# Patient Record
Sex: FEMALE | Race: WHITE | NOT HISPANIC OR LATINO | URBAN - METROPOLITAN AREA
[De-identification: names, ages, dates, MRNs, and addresses within clinical notes are randomized per-mention and may not be internally consistent; named-entity substitution may affect disease eponyms.]

---

## 2017-07-11 ENCOUNTER — HOSPITAL ENCOUNTER (OUTPATIENT)
Dept: MAMMOGRAPHY | Facility: HOSPITAL | Age: 62
Discharge: HOME | End: 2017-07-11
Attending: FAMILY MEDICINE

## 2017-07-11 DIAGNOSIS — Z12.31 VISIT FOR SCREENING MAMMOGRAM: ICD-10-CM

## 2017-10-10 ENCOUNTER — RECORDS - HEALTHEAST (OUTPATIENT)
Dept: ADMINISTRATIVE | Facility: OTHER | Age: 62
End: 2017-10-10

## 2017-10-11 ENCOUNTER — RECORDS - HEALTHEAST (OUTPATIENT)
Dept: ADMINISTRATIVE | Facility: OTHER | Age: 62
End: 2017-10-11

## 2017-10-30 ENCOUNTER — OFFICE VISIT - HEALTHEAST (OUTPATIENT)
Dept: FAMILY MEDICINE | Facility: CLINIC | Age: 62
End: 2017-10-30

## 2017-10-30 DIAGNOSIS — Z00.00 ROUTINE GENERAL MEDICAL EXAMINATION AT A HEALTH CARE FACILITY: ICD-10-CM

## 2017-10-30 LAB
CHOLEST SERPL-MCNC: 188 MG/DL
FASTING STATUS PATIENT QL REPORTED: YES
HDLC SERPL-MCNC: 63 MG/DL
LDLC SERPL CALC-MCNC: 111 MG/DL
TRIGL SERPL-MCNC: 68 MG/DL

## 2017-10-30 ASSESSMENT — MIFFLIN-ST. JEOR: SCORE: 1325.13

## 2017-11-16 ENCOUNTER — COMMUNICATION - HEALTHEAST (OUTPATIENT)
Dept: FAMILY MEDICINE | Facility: CLINIC | Age: 62
End: 2017-11-16

## 2017-11-16 DIAGNOSIS — E78.5 HYPERLIPEMIA: ICD-10-CM

## 2018-01-08 ENCOUNTER — OFFICE VISIT - HEALTHEAST (OUTPATIENT)
Dept: ONCOLOGY | Facility: CLINIC | Age: 63
End: 2018-01-08

## 2018-01-08 DIAGNOSIS — C18.7 MALIGNANT NEOPLASM OF SIGMOID COLON (H): ICD-10-CM

## 2018-01-08 LAB
ALBUMIN SERPL-MCNC: 4 G/DL (ref 3.5–5)
ALP SERPL-CCNC: 71 U/L (ref 45–120)
ALT SERPL W P-5'-P-CCNC: 17 U/L (ref 0–45)
ANION GAP SERPL CALCULATED.3IONS-SCNC: 8 MMOL/L (ref 5–18)
AST SERPL W P-5'-P-CCNC: 16 U/L (ref 0–40)
BASOPHILS # BLD AUTO: 0.1 THOU/UL (ref 0–0.2)
BASOPHILS NFR BLD AUTO: 1 % (ref 0–2)
BILIRUB SERPL-MCNC: 0.3 MG/DL (ref 0–1)
BUN SERPL-MCNC: 16 MG/DL (ref 8–22)
CALCIUM SERPL-MCNC: 10.2 MG/DL (ref 8.5–10.5)
CEA SERPL-MCNC: 1.2 NG/ML (ref 0–3)
CHLORIDE BLD-SCNC: 105 MMOL/L (ref 98–107)
CO2 SERPL-SCNC: 29 MMOL/L (ref 22–31)
CREAT SERPL-MCNC: 0.78 MG/DL (ref 0.6–1.1)
EOSINOPHIL # BLD AUTO: 0.3 THOU/UL (ref 0–0.4)
EOSINOPHIL NFR BLD AUTO: 4 % (ref 0–6)
ERYTHROCYTE [DISTWIDTH] IN BLOOD BY AUTOMATED COUNT: 12.5 % (ref 11–14.5)
GFR SERPL CREATININE-BSD FRML MDRD: >60 ML/MIN/1.73M2
GLUCOSE BLD-MCNC: 87 MG/DL (ref 70–125)
HCT VFR BLD AUTO: 42.2 % (ref 35–47)
HGB BLD-MCNC: 14.3 G/DL (ref 12–16)
LYMPHOCYTES # BLD AUTO: 2.8 THOU/UL (ref 0.8–4.4)
LYMPHOCYTES NFR BLD AUTO: 33 % (ref 20–40)
MCH RBC QN AUTO: 29.9 PG (ref 27–34)
MCHC RBC AUTO-ENTMCNC: 33.9 G/DL (ref 32–36)
MCV RBC AUTO: 88 FL (ref 80–100)
MONOCYTES # BLD AUTO: 0.8 THOU/UL (ref 0–0.9)
MONOCYTES NFR BLD AUTO: 10 % (ref 2–10)
NEUTROPHILS # BLD AUTO: 4.5 THOU/UL (ref 2–7.7)
NEUTROPHILS NFR BLD AUTO: 53 % (ref 50–70)
PLATELET # BLD AUTO: 268 THOU/UL (ref 140–440)
PMV BLD AUTO: 10.8 FL (ref 8.5–12.5)
POTASSIUM BLD-SCNC: 3.5 MMOL/L (ref 3.5–5)
PROT SERPL-MCNC: 8.1 G/DL (ref 6–8)
RBC # BLD AUTO: 4.78 MILL/UL (ref 3.8–5.4)
SODIUM SERPL-SCNC: 142 MMOL/L (ref 136–145)
WBC: 8.5 THOU/UL (ref 4–11)

## 2018-01-08 RX ORDER — MULTIVIT-MINERALS/FOLIC ACID 200 MCG
TABLET,CHEWABLE ORAL
Status: SHIPPED | COMMUNITY
Start: 2014-09-24

## 2018-01-08 ASSESSMENT — MIFFLIN-ST. JEOR: SCORE: 1343.73

## 2018-11-05 ENCOUNTER — OFFICE VISIT - HEALTHEAST (OUTPATIENT)
Dept: FAMILY MEDICINE | Facility: CLINIC | Age: 63
End: 2018-11-05

## 2018-11-05 DIAGNOSIS — Z13.220 ENCOUNTER FOR SCREENING FOR LIPOID DISORDERS: ICD-10-CM

## 2018-11-05 DIAGNOSIS — Z12.31 ENCOUNTER FOR SCREENING MAMMOGRAM FOR MALIGNANT NEOPLASM OF BREAST: ICD-10-CM

## 2018-11-05 DIAGNOSIS — Z78.0 POSTMENOPAUSAL: ICD-10-CM

## 2018-11-05 DIAGNOSIS — Z00.01 ENCOUNTER FOR GENERAL ADULT MEDICAL EXAMINATION WITH ABNORMAL FINDINGS: ICD-10-CM

## 2018-11-05 DIAGNOSIS — Z85.038 HISTORY OF MALIGNANT NEOPLASM OF LARGE INTESTINE: ICD-10-CM

## 2018-11-05 DIAGNOSIS — E78.5 DYSLIPIDEMIA: ICD-10-CM

## 2018-11-05 LAB
ALT SERPL W P-5'-P-CCNC: 23 U/L (ref 0–45)
CHOLEST SERPL-MCNC: 221 MG/DL
FASTING STATUS PATIENT QL REPORTED: YES
FASTING STATUS PATIENT QL REPORTED: YES
GLUCOSE BLD-MCNC: 90 MG/DL (ref 70–99)
HDLC SERPL-MCNC: 67 MG/DL
LDLC SERPL CALC-MCNC: 129 MG/DL
TRIGL SERPL-MCNC: 126 MG/DL

## 2018-11-05 ASSESSMENT — MIFFLIN-ST. JEOR: SCORE: 1339.87

## 2018-11-07 ENCOUNTER — COMMUNICATION - HEALTHEAST (OUTPATIENT)
Dept: FAMILY MEDICINE | Facility: CLINIC | Age: 63
End: 2018-11-07

## 2018-11-07 DIAGNOSIS — E78.5 HYPERLIPEMIA: ICD-10-CM

## 2018-11-12 ENCOUNTER — COMMUNICATION - HEALTHEAST (OUTPATIENT)
Dept: FAMILY MEDICINE | Facility: CLINIC | Age: 63
End: 2018-11-12

## 2018-11-12 DIAGNOSIS — E78.5 HYPERLIPEMIA: ICD-10-CM

## 2018-11-20 ENCOUNTER — RECORDS - HEALTHEAST (OUTPATIENT)
Dept: ADMINISTRATIVE | Facility: OTHER | Age: 63
End: 2018-11-20

## 2018-11-20 ENCOUNTER — RECORDS - HEALTHEAST (OUTPATIENT)
Dept: BONE DENSITY | Facility: CLINIC | Age: 63
End: 2018-11-20

## 2018-11-20 DIAGNOSIS — Z78.0 ASYMPTOMATIC MENOPAUSAL STATE: ICD-10-CM

## 2019-01-21 ENCOUNTER — HOSPITAL ENCOUNTER (OUTPATIENT)
Dept: MAMMOGRAPHY | Facility: CLINIC | Age: 64
Discharge: HOME | End: 2019-01-21
Attending: FAMILY MEDICINE

## 2019-01-21 DIAGNOSIS — Z12.31 ENCOUNTER FOR SCREENING MAMMOGRAM FOR MALIGNANT NEOPLASM OF BREAST: ICD-10-CM

## 2019-11-04 ENCOUNTER — COMMUNICATION - HEALTHEAST (OUTPATIENT)
Dept: FAMILY MEDICINE | Facility: CLINIC | Age: 64
End: 2019-11-04

## 2019-11-04 DIAGNOSIS — E78.5 HYPERLIPEMIA: ICD-10-CM

## 2019-12-12 ENCOUNTER — OFFICE VISIT - HEALTHEAST (OUTPATIENT)
Dept: FAMILY MEDICINE | Facility: CLINIC | Age: 64
End: 2019-12-12

## 2019-12-12 DIAGNOSIS — M85.89 OSTEOPENIA OF MULTIPLE SITES: ICD-10-CM

## 2019-12-12 DIAGNOSIS — Z00.01 ENCOUNTER FOR GENERAL ADULT MEDICAL EXAMINATION WITH ABNORMAL FINDINGS: ICD-10-CM

## 2019-12-12 DIAGNOSIS — Z23 NEED FOR PNEUMOCOCCAL VACCINATION: ICD-10-CM

## 2019-12-12 DIAGNOSIS — Z13.220 ENCOUNTER FOR SCREENING FOR LIPOID DISORDERS: ICD-10-CM

## 2019-12-12 DIAGNOSIS — Z13.1 ENCOUNTER FOR SCREENING FOR DIABETES MELLITUS: ICD-10-CM

## 2019-12-12 DIAGNOSIS — E78.5 HYPERLIPIDEMIA LDL GOAL <130: ICD-10-CM

## 2019-12-12 LAB
ALT SERPL W P-5'-P-CCNC: 18 U/L (ref 0–45)
CHOLEST SERPL-MCNC: 180 MG/DL
FASTING STATUS PATIENT QL REPORTED: YES
FASTING STATUS PATIENT QL REPORTED: YES
GLUCOSE BLD-MCNC: 92 MG/DL (ref 70–99)
HDLC SERPL-MCNC: 59 MG/DL
LDLC SERPL CALC-MCNC: 99 MG/DL
TRIGL SERPL-MCNC: 108 MG/DL

## 2019-12-12 ASSESSMENT — MIFFLIN-ST. JEOR: SCORE: 1339.36

## 2019-12-12 ASSESSMENT — PATIENT HEALTH QUESTIONNAIRE - PHQ9: SUM OF ALL RESPONSES TO PHQ QUESTIONS 1-9: 0

## 2019-12-12 ASSESSMENT — ANXIETY QUESTIONNAIRES
5. BEING SO RESTLESS THAT IT IS HARD TO SIT STILL: NOT AT ALL
3. WORRYING TOO MUCH ABOUT DIFFERENT THINGS: NOT AT ALL
IF YOU CHECKED OFF ANY PROBLEMS ON THIS QUESTIONNAIRE, HOW DIFFICULT HAVE THESE PROBLEMS MADE IT FOR YOU TO DO YOUR WORK, TAKE CARE OF THINGS AT HOME, OR GET ALONG WITH OTHER PEOPLE: NOT DIFFICULT AT ALL
2. NOT BEING ABLE TO STOP OR CONTROL WORRYING: NOT AT ALL
GAD7 TOTAL SCORE: 0
1. FEELING NERVOUS, ANXIOUS, OR ON EDGE: NOT AT ALL
6. BECOMING EASILY ANNOYED OR IRRITABLE: NOT AT ALL
7. FEELING AFRAID AS IF SOMETHING AWFUL MIGHT HAPPEN: NOT AT ALL
4. TROUBLE RELAXING: NOT AT ALL

## 2020-01-10 ENCOUNTER — COMMUNICATION - HEALTHEAST (OUTPATIENT)
Dept: FAMILY MEDICINE | Facility: CLINIC | Age: 65
End: 2020-01-10

## 2020-01-10 DIAGNOSIS — E78.5 HYPERLIPEMIA: ICD-10-CM

## 2020-06-09 ENCOUNTER — HOSPITAL ENCOUNTER (OUTPATIENT)
Dept: MAMMOGRAPHY | Facility: CLINIC | Age: 65
Discharge: HOME | End: 2020-06-09
Attending: FAMILY MEDICINE

## 2020-06-09 DIAGNOSIS — Z12.31 VISIT FOR SCREENING MAMMOGRAM: ICD-10-CM

## 2021-02-05 ENCOUNTER — COMMUNICATION - HEALTHEAST (OUTPATIENT)
Dept: FAMILY MEDICINE | Facility: CLINIC | Age: 66
End: 2021-02-05

## 2021-02-05 DIAGNOSIS — E78.5 HYPERLIPEMIA: ICD-10-CM

## 2021-02-05 RX ORDER — SIMVASTATIN 20 MG
20 TABLET ORAL AT BEDTIME
Qty: 90 TABLET | Refills: 3 | Status: SHIPPED | OUTPATIENT
Start: 2021-02-05

## 2021-05-25 ENCOUNTER — RECORDS - HEALTHEAST (OUTPATIENT)
Dept: ADMINISTRATIVE | Facility: CLINIC | Age: 66
End: 2021-05-25

## 2021-05-26 ENCOUNTER — RECORDS - HEALTHEAST (OUTPATIENT)
Dept: ADMINISTRATIVE | Facility: CLINIC | Age: 66
End: 2021-05-26

## 2021-05-26 ASSESSMENT — PATIENT HEALTH QUESTIONNAIRE - PHQ9: SUM OF ALL RESPONSES TO PHQ QUESTIONS 1-9: 0

## 2021-05-27 ENCOUNTER — RECORDS - HEALTHEAST (OUTPATIENT)
Dept: ADMINISTRATIVE | Facility: CLINIC | Age: 66
End: 2021-05-27

## 2021-05-28 ENCOUNTER — RECORDS - HEALTHEAST (OUTPATIENT)
Dept: ADMINISTRATIVE | Facility: CLINIC | Age: 66
End: 2021-05-28

## 2021-05-28 ASSESSMENT — ANXIETY QUESTIONNAIRES: GAD7 TOTAL SCORE: 0

## 2021-05-29 ENCOUNTER — RECORDS - HEALTHEAST (OUTPATIENT)
Dept: ADMINISTRATIVE | Facility: CLINIC | Age: 66
End: 2021-05-29

## 2021-05-30 ENCOUNTER — RECORDS - HEALTHEAST (OUTPATIENT)
Dept: ADMINISTRATIVE | Facility: CLINIC | Age: 66
End: 2021-05-30

## 2021-05-31 VITALS — HEIGHT: 65 IN | WEIGHT: 175.1 LBS | BODY MASS INDEX: 29.17 KG/M2

## 2021-05-31 VITALS — WEIGHT: 179.2 LBS | BODY MASS INDEX: 29.85 KG/M2 | HEIGHT: 65 IN

## 2021-06-02 VITALS — HEIGHT: 65 IN | WEIGHT: 177.8 LBS | BODY MASS INDEX: 29.62 KG/M2

## 2021-06-03 NOTE — TELEPHONE ENCOUNTER
Refill Approved    Rx renewed per Medication Renewal Policy. Medication was last renewed on 11/12/18    Upcoming appointment 12/12/19    Xiao Avendano, [facility] Triage/Med Refill 11/4/2019     Requested Prescriptions   Pending Prescriptions Disp Refills     simvastatin (ZOCOR) 20 MG tablet 90 tablet 3     Sig: Take 1 tablet (20 mg total) by mouth at bedtime.       Statins Refill Protocol (Hmg CoA Reductase Inhibitors) Passed - 11/4/2019  8:16 AM        Passed - PCP or prescribing provider visit in past 12 months      Last office visit with prescriber/PCP: Visit date not found OR same dept: Visit date not found OR same specialty: Visit date not found  Last physical: 11/5/2018 Last MTM visit: Visit date not found   Next visit within 3 mo: Visit date not found  Next physical within 3 mo: Visit date not found  Prescriber OR PCP: Hien Pelaez MD  Last diagnosis associated with med order: 1. Hyperlipemia  - simvastatin (ZOCOR) 20 MG tablet; Take 1 tablet (20 mg total) by mouth at bedtime.  Dispense: 90 tablet; Refill: 3    If protocol passes may refill for 12 months if within 3 months of last provider visit (or a total of 15 months).

## 2021-06-04 VITALS
WEIGHT: 177 LBS | HEIGHT: 65 IN | DIASTOLIC BLOOD PRESSURE: 72 MMHG | SYSTOLIC BLOOD PRESSURE: 146 MMHG | BODY MASS INDEX: 29.49 KG/M2 | HEART RATE: 80 BPM

## 2021-06-04 NOTE — PROGRESS NOTES
Assessment/Plan      Annual Wellness Visit  new patient to my practice  Mary was seen today for annual wellness visit.    Diagnoses and all orders for this visit:    Need for pneumococcal vaccination  Comments:  refuse vaccination today will call us back after double check with insurance     Encounter for general adult medical examination with abnormal findings    Osteopenia of multiple sites    Encounter for screening for diabetes mellitus  -     Glucose    Encounter for screening for lipoid disorders  -     Lipid Luce, FASTING; Future  -     Lipid Cascade, FASTING    Hyperlipidemia LDL goal <130  -     ALT (SGPT)    Other orders  -     Pneumococcal conjugate vaccine 13-valent 6wks-17yrs; >50yrs    will Follow up yearly, no major concerns refill statin prn   Will Follow up on results change on tx plan based on that   Subjective:      Mary Iglesias is a 64 y.o. female who presents for a Subsequent Annual Wellness Visit.   No concerns   Taking cholesterol medication with no side effect.  Levels are always very good.  Eat healthy and exercise daily  History of colon cancer last colonoscopy just last year now she moved 5-year cycle.  HM were updated      Healthy Habits:   Regular Exercise: Yes  Sunscreen Use: Yes  Healthy Diet: Yes  Dental Visits Regularly: Yes  Seat Belt: Yes  cologuard  No  Colonoscopy: Yes  Lipid Profile: Yes  Glucose Screen: Yes  Prevention of Osteoporosis: Yes  Last Dexa: Yes  Guns at Home:  N/A      Immunization History   Administered Date(s) Administered     Td,adult,historic,unspecified 07/08/2014       Immunization status: up to date and documented, Refuses Immunization.    Current Outpatient Medications   Medication Sig Dispense Refill     cholecalciferol, vitamin D3, 2,000 unit Tab Take by mouth.       lactobacillus comb no.10 (PROBIOTIC) 20 billion cell cap Take by mouth.       MAGNESIUM CITRATE ORAL Take by mouth.       multivitamin capsule Take 1 capsule by mouth daily.        simvastatin (ZOCOR) 20 MG tablet Take 1 tablet (20 mg total) by mouth at bedtime. 30 tablet 0     No current facility-administered medications for this visit.      Past Medical History:   Diagnosis Date     Colon cancer (H)      Past Surgical History:   Procedure Laterality Date     BREAST BIOPSY Right 2007     OH APPENDECTOMY      Description: Appendectomy;  Recorded: 2013;     OH DILATION/CURETTAGE,DIAGNOSTIC      Description: Dilation And Curettage;  Recorded: 2012;     OH PART REMOVAL COLON W ANASTOMOSIS      Description: Partial Colectomy - Sigmoid;  Recorded: 2013;       Patient has no known allergies.    Family History   Problem Relation Age of Onset     Colon cancer Mother      Stroke Mother      Heart attack Father        Social History     Socioeconomic History     Marital status:      Spouse name: Not on file     Number of children: Not on file     Years of education: Not on file     Highest education level: Not on file   Occupational History     Not on file   Social Needs     Financial resource strain: Not on file     Food insecurity:     Worry: Not on file     Inability: Not on file     Transportation needs:     Medical: Not on file     Non-medical: Not on file   Tobacco Use     Smoking status: Former Smoker     Last attempt to quit: 2006     Years since quittin.0     Smokeless tobacco: Never Used   Substance and Sexual Activity     Alcohol use: Not on file     Drug use: Not on file     Sexual activity: Not on file   Lifestyle     Physical activity:     Days per week: Not on file     Minutes per session: Not on file     Stress: Not on file   Relationships     Social connections:     Talks on phone: Not on file     Gets together: Not on file     Attends [employer] service: Not on file     Active member of club or organization: Not on file     Attends meetings of clubs or organizations: Not on file     Relationship status: Not on file     Intimate partner violence:     " Fear of current or ex partner: Not on file     Emotionally abused: Not on file     Physically abused: Not on file     Forced sexual activity: Not on file   Other Topics Concern     Not on file   Social History Narrative     lives with her     2 kids and 6 grandkids    Most of the family in the [facility]    Hien Pelaez MD 11/5/2018 10:12 AM         Social History     Social History Narrative     lives with her     2 kids and 6 grandkids    Most of the family in the [facility]    Hien Pelaez MD 11/5/2018 10:12 AM         Current Diet:  well balanced diet    Functional Ability/Level of Safety  Fall Risk Factors:  No risk  Home Safety Risk Factors: None    Advanced Directive:  I have had an Advanced Directive discussion with the patient.    Co-Managers and Medical Equipment/Suppliers: None    Review of Systems  A 12 point comprehensive review of systems was negative except as noted.    Vitals:    12/12/19 0925   BP: 146/72   Patient Site: Left Arm   Patient Position: Sitting   Cuff Size: Adult Large   Pulse: 80   Weight: 177 lb (80.3 kg)   Height: 5' 5.35\" (1.66 m)         General: Alert, no acute distress.   HEENT: normocephalic conjunctivae are clear, Normal pearly TMs bilaterally without erythema, pus or fluid.   Nose is clear.  Oropharynx is moist and clear,   Neck: supple without adenopathy or thyromegaly.  Lungs: Good aeration bilaterally. Clear to auscultation without wheezes, rales or rhonci.  Heart: regular rate and rhythm, normal S1 and S2, no murmurs  Abdomen: soft and nontender, bowel sounds are present, no hepatosplenomegaly or mass palpable.  Skin: clear without rash or lesions  Neuro: alert, interactive moving all extremities equally, normal muscle tone in all 4 extremities, deep tendon reflexes 2+ symmetrically at the patella    The following high BMI interventions were performed this visit: dietary management education, guidance, and counseling    EKG : not done "     Assessment Results 12/12/2019   Activities of Daily Living No help needed   Instrumental Activities of Daily Living No help needed   Mini Cog Total Score 5   Some recent data might be hidden     A Mini Cog score of 0-2 suggests the possibility of dementia, score of 3-5 suggests no dementia    Identified Health Risks:           Hien Pelaez MD 12/12/2019 10:10 AM              The patient was counseled and encouraged to consider modifying their diet and eating habits. She was provided with information on recommended healthy diet options.  Information regarding advance directives (living andrade), including where she can download the appropriate form, was provided to the patient via the AVS.

## 2021-06-13 NOTE — PROGRESS NOTES
"Assessment/Plan:        Diagnoses and all orders for this visit:    Routine general medical examination at a health care facility  -     Lipid Cascade  -     HM2(CBC w/o Differential)  -     Glucose        She is fasting.  We will do labs.  Continue with healthy food choices, regular exercise.  Mammogram in July 2018.  DEXA scan September 2018.  Encourage annual physical.  Declined flu shot.  She was agreeable with the plans.  Subjective:    Patient ID: Mary Iglesias is a 62 y.o. female.    HPI    Mary is here for her physical.  She has a history of colon cancer diagnosed in 2013.  She had a colonoscopy this month and shows sessile serrated adenoma, cleared for 5 years.  She denies any blood in her stool, changes in her stool size, abdominal pains.  No recent unexplained weight loss.  Has been trying to eat healthy, regular exercise.    Last mammogram July 11, 2017, normal.  DEXA scan September 2016, osteopenia with low fracture risk.  Pap smear in 2015, normal.  No previous abnormal Pap smears.  No vaginal discharge, bleeding at this time.    Review of Systems  As above otherwise negative.    Past medical history, surgery and family history reviewed and as above.    Social history: Denies any issues with smoking.  Has around 3 alcoholic beverage per week.  She walks 2 miles, 5 days a week.  She also goes to [employer], 3-4 days a week and exercises for around 30 minutes.        Objective:    Physical Exam  /72 (Patient Site: Right Arm, Patient Position: Sitting, Cuff Size: Adult Regular)  Pulse 78  Ht 5' 5\" (1.651 m)  Wt 175 lb 1.6 oz (79.4 kg)  Breastfeeding? No  BMI 29.14 kg/m2    Vital signs noted above. AAO ×3.  HEENT negative.  Neck: Supple neck, nonpalpable cervical lymph nodes. No thyromegaly. Lungs: Clear to auscultation bilateral.  Heart: S1-S2 regular rate and rhythm, no murmurs were noted.  Abdomen: Flabby, soft with bowel sounds and nontender.  Extremities: No edema, pulses were full and equal. " Breast exam: No nipple bleeding or discharge, no mass or tenderness, no axillary lymphadenopathy.  Pelvic exam: Negative CMT, no adnexal mass or tenderness.

## 2021-06-15 NOTE — PROGRESS NOTES
[facility] Hematology and Oncology Progress Note    Patient: Mary Iglesias  MRN: [id number]  Date of Service: 01/08/2018      Assessment and Plan:    1.  Colon cancer: No clinical evidence of disease recurrence.  CEA is normal.  She is now over 4 years out.  She has stage I cancer.  At this point I do not think we need to follow her anymore in the oncology clinic.  She should continue with her regular screening colonoscopies.  She should let us know in the future if she develops any abdominal pain, nausea vomiting, or other new symptoms.  Questions were answered.    ECOG Performance   ECOG Performance Status: 0    Distress Assessment  Distress Assessment Score: No distress    Pain  Currently in Pain: No/denies    Diagnosis:    1. 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 Diagnosed August 2013      2. Left upper lobe pulmonary nodule      Treatment:    She had a left hemicolectomy August 15, 2013 by Dr. Garcia. She did not receive any adjuvant therapy.    Interim History:    Mary returns for follow-up visit.  She is last seen about a year ago.  She has been doing well.  No abdominal pain, no nausea vomiting or change in bowel or bladder habits.    Review of Systems:    Constitutional  Constitutional (WDL): All constitutional elements are within defined limits  Neurosensory  Neurosensory (WDL): All neurosensory elements are within defined limits  Cardiovascular  Cardiovascular (WDL): All cardiovascular elements are within defined limits  Pulmonary  Respiratory (WDL): Within Defined Limits  Gastrointestinal  Gastrointestinal (WDL): All gastrointestinal elements are within defined limits  Genitourinary  Genitourinary (WDL): All genitourinary elements are within defined limits  Integumentary  Integumentary (WDL): All integumentary elements are within defined limits  Patient Coping  Patient Coping: Accepting  Accompanied by  Accompanied by: Alone    Past History:    Past Medical History:   Diagnosis Date      "Colon cancer 2013     Physical Exam:    Recent Vitals 1/8/2018   Height 5' 5\"   Weight 179 lbs 3 oz   BSA (m2) 1.93 m2   /77   Pulse 90   Temp -   Temp src -   SpO2 95   Some recent data might be hidden     General: patient appears stated age of 62 y.o.. Nontoxic and in no distress.   HEENT: Head: atraumatic, normocephalic. Sclerae anicteric.  Chest:  Normal respiratory effort  Cardiac:  No edema.   Abdomen: abdomen is non-distended  Extremities: normal tone and muscle bulk.  Skin: no lesions or rash. Warm and dry.   CNS: alert and oriented x3. Grossly non-focal.   Psychiatric: normal mood and affect.     Lab Results:    Results for DEACON VALERIO (MRN [id number]) as of 1/28/2018 20:54   Ref. Range 1/8/2018 15:27   Sodium Latest Ref Range: 136 - 145 mmol/L 142   Potassium Latest Ref Range: 3.5 - 5.0 mmol/L 3.5   Chloride Latest Ref Range: 98 - 107 mmol/L 105   CO2 Latest Ref Range: 22 - 31 mmol/L 29   Anion Gap, Calculation Latest Ref Range: 5 - 18 mmol/L 8   BUN Latest Ref Range: 8 - 22 mg/dL 16   Creatinine Latest Ref Range: 0.60 - 1.10 mg/dL 0.78   GFR MDRD Af Amer Latest Ref Range: >60 mL/min/1.73m2 >60   GFR MDRD Non Af Amer Latest Ref Range: >60 mL/min/1.73m2 >60   Calcium Latest Ref Range: 8.5 - 10.5 mg/dL 10.2   AST Latest Ref Range: 0 - 40 U/L 16   ALT Latest Ref Range: 0 - 45 U/L 17   ALBUMIN Latest Ref Range: 3.5 - 5.0 g/dL 4.0   Protein, Total Latest Ref Range: 6.0 - 8.0 g/dL 8.1 (H)   Alkaline Phosphatase Latest Ref Range: 45 - 120 U/L 71   Bilirubin, Total Latest Ref Range: 0.0 - 1.0 mg/dL 0.3   Glucose Latest Ref Range: 70 - 125 mg/dL 87   CEA Latest Ref Range: 0.0 - 3.0 ng/mL 1.2   WBC Latest Ref Range: 4.0 - 11.0 thou/uL 8.5   RBC Latest Ref Range: 3.80 - 5.40 mill/uL 4.78   Hemoglobin Latest Ref Range: 12.0 - 16.0 g/dL 14.3   Hematocrit Latest Ref Range: 35.0 - 47.0 % 42.2   MCV Latest Ref Range: 80 - 100 fL 88   MCH Latest Ref Range: 27.0 - 34.0 pg 29.9   MCHC Latest Ref Range: 32.0 - 36.0 " g/dL 33.9   RDW Latest Ref Range: 11.0 - 14.5 % 12.5   Platelets Latest Ref Range: 140 - 440 thou/uL 268   MPV Latest Ref Range: 8.5 - 12.5 fL 10.8   Neutrophils % Latest Ref Range: 50 - 70 % 53   Lymphocytes % Latest Ref Range: 20 - 40 % 33   Monocytes % Latest Ref Range: 2 - 10 % 10   Eosinophils % Latest Ref Range: 0 - 6 % 4   Basophils % Latest Ref Range: 0 - 2 % 1   Neutrophils Absolute Latest Ref Range: 2.0 - 7.7 thou/uL 4.5   Lymphocytes Absolute Latest Ref Range: 0.8 - 4.4 thou/uL 2.8   Monocytes Absolute Latest Ref Range: 0.0 - 0.9 thou/uL 0.8   Eosinophils Absolute Latest Ref Range: 0.0 - 0.4 thou/uL 0.3   Basophils Absolute Latest Ref Range: 0.0 - 0.2 thou/uL 0.1     Imaging:    No results found.      Signed by: Anuel Lockett MD

## 2021-06-16 PROBLEM — M85.89 OSTEOPENIA OF MULTIPLE SITES: Status: ACTIVE | Noted: 2019-12-12

## 2021-06-16 PROBLEM — Z85.038 HISTORY OF MALIGNANT NEOPLASM OF LARGE INTESTINE: Status: ACTIVE | Noted: 2019-12-12

## 2021-06-16 PROBLEM — K63.5 COLORECTAL POLYPS: Status: ACTIVE | Noted: 2019-12-12

## 2021-06-16 PROBLEM — K62.1 COLORECTAL POLYPS: Status: ACTIVE | Noted: 2019-12-12

## 2021-06-17 NOTE — PATIENT INSTRUCTIONS - HE
Patient Instructions by Hien Pelaez MD at 12/12/2019  9:20 AM     Author: Hien Pelaez MD Service: -- Author Type: Physician    Filed: 12/12/2019  6:55 AM Encounter Date: 12/12/2019 Status: Signed    : Hien Pelaez MD (Physician)         Patient Education   Understanding USDA MyPlate  The USDA (US Department of Agriculture) has guidelines to help you make healthy food choices. These are called MyPlate. MyPlate shows the food groups that make up healthy meals using the image of a place setting. Before you eat, think about the healthiest choices for what to put onto your plate or into your cup or bowl. To learn more about building a healthy plate, visit www.choosemyplate.gov.       The Food Groups    Fruits: Any fruit or 100% fruit juice counts as part of the Fruit Group. Fruits may be fresh, canned, frozen, or dried, and may be whole, cut-up, or pureed. Make half your plate fruits and vegetables.    Vegetables: Any vegetable or 100% vegetable juice counts as a member of the Vegetable Group. Vegetables may be fresh, frozen, canned, or dried. They can be served raw or cooked and may be whole, cut-up, or mashed. Make half your plate fruits and vegetables.     Grains: All foods made from grains are part of the Grains Group. These include wheat, rice, oats, cornmeal, and barley such as bread, pasta, oatmeal, cereal, tortillas, and grits. Grains should be no more than a quarter of your plate. At least half of your grains should be whole grains.    Protein: This group includes meat, poultry, seafood, beans and peas, eggs, processed soy products (like tofu), nuts (including nut butters), and seeds. Make protein choices no more than a quarter of your plate. Meat and poultry choices should be lean or low fat.    Dairy: All fluid milk products and foods made from milk that contain calcium, like yogurt and cheese are part of the Dairy Group. (Foods that have little calcium, such as cream, butter, and cream cheese, are  not part of the group.) Most dairy choices should be low-fat or fat-free.    Oils: These are fats that are liquid at room temperature. They include canola, corn, olive, soybean, and sunflower oil. Foods that are mainly oil include mayonnaise, certain salad dressings, and soft margarines. You should have only 5 to 7 teaspoons of oils a day. You probably already get this much from the food you eat.  Use Maya[employer] Momer to Help Build Your Meals  The SuperTracker can help you plan and track your meals and activity. You can look up individual foods to see or compare their nutritional value. You can get guidelines for what and how much you should eat. You can compare your food choices. And you can assess personal physical activities and see ways you can improve. Go to www.Yunait.gov/[employer]/.    7526-4451 [employer]. [address] [place] [address]. All rights reserved. This information is not intended as a substitute for professional medical care. Always follow your healthcare professional's instructions.           Patient Education   Understanding Advance Care Planning  Advance care planning is the process of deciding ones own future medical care. It helps ensure that if you cant speak for yourself, your wishes can still be carried out. The plan is a series of legal documents that note a persons wishes. The documents vary by state. Advance care planning may be done when a person has a serious illness that is expected to get worse. It may be done before major surgery. And it can help you and your family be prepared in case of a major illness or injury. Advance care planning helps with making decisions at these times.       A health care proxy is a person who acts as the voice of a patient when the patient cant speak for himself or herself. The name of this role varies by state. It may be called a Durable Medical Power of  or Durable Power of  for Healthcare. It may be  called an agent, surrogate, or advocate. Or it may be called a representative or decision maker. It is an official duty that is identified by a legal document. The document also varies by state.    Why Is Advance Care Planning Important?  If a person communicates their healthcare wishes:    They will be given medical care that matches their values and goals.    Their family members will not be forced to make decisions in a crisis with no guidance.  Creating a Plan  Making an advance care plan is often done in 3 steps:    Thinking about ones wishes. To create an advance care plan, you should think about what kind of medical treatment you would want if you lose the ability to communicate. Are there any situations in which you would refuse or stop treatment? Are there therapies you would want or not want? And whom do you want to make decisions for you? There are many places to learn more about how to plan for your care. Ask your doctor or  for resources.    Picking a health care proxy. This means choosing a trusted person to speak for you only when you cant speak for yourself. When you cannot make medical decisions, your proxy makes sure the instructions in your advance care plan are followed. A proxy does not make decisions based on his or her own opinions. They must put aside those opinions and values if needed, and carry out your wishes.    Filling out the legal documents. There are several kinds of legal documents for advance care planning. Each one tells health care providers your wishes. The documents may vary by state. They must be signed and may need to be witnessed or notarized. You can cancel or change them whenever you wish. Depending on your state, the documents may include a Healthcare Proxy form, Living Will, Durable Medical Power of , Advance Directive, or others.  The Familys Role  The best help a family can give is to support their loved ones wishes. Open and honest communication  is vital. Family should express any concerns they have about the patients choices while the patient can still make decisions.    1663-7333 [employer]. [address] [place] [address], [place] [address]. All rights reserved. This information is not intended as a substitute for professional medical care. Always follow your healthcare professional's instructions.         Also, [facility] offers a free, downloadable health care directive that allows you to share your treatment choices and personal preferences if you cannot communicate your wishes. It also allows you to appoint another person (called a health care agent) to make health care decisions if you are unable to do so. You can download an advance directive by going here: [email or web address]     Patient Education   Personalized Prevention Plan  You are due for the preventive services outlined below.  Your care team is available to assist you in scheduling these services.  If you have already completed any of these items, please share that information with your care team to update in your medical record.  Health Maintenance   Topic Date Due   ? ZOSTER VACCINES (1 of 2) 03/19/2003   ? PNEUMOCOCCAL IMMUNIZATION 65+ LOW/MEDIUM RISK (1 of 2 - PCV13) 03/19/2018   ? INFLUENZA VACCINE RULE BASED (1) 08/01/2019   ? MEDICARE ANNUAL WELLNESS VISIT  11/05/2019   ? FALL RISK ASSESSMENT  11/05/2019   ? MAMMOGRAM  01/21/2020   ? DXA SCAN  11/20/2020   ? ADVANCE CARE PLANNING  09/08/2021   ? COLONOSCOPY  10/11/2022   ? LIPID  11/05/2023   ? TD 18+ HE  07/08/2024   ? HEPATITIS C SCREENING  Discontinued

## 2021-06-21 NOTE — PROGRESS NOTES
Assessment/Plan      Annual Wellness Visit  new patient to my practice  Mary was seen today for annual wellness visit and establish care.    Diagnoses and all orders for this visit:    Encounter for general adult medical examination with abnormal findings  -     Glucose; Future  -     Glucose    Encounter for screening mammogram for malignant neoplasm of breast  -     Mammo Screening Bilateral; Future    Encounter for screening for lipoid disorders  -     Lipid [place], FASTING; Future  -     Lipid [place], FASTING    Postmenopausal  -     DXA Bone Density Scan; Future    Dyslipidemia  -     ALT (SGPT)    History of malignant neoplasm of large intestine    BMI 29.0-29.9,adult        Subjective:      Mary Iglesias is a 63 y.o. female who presents for a Welcome to Medicare Visit.   No concerns   Taking cholesterol medication with no side effect.  Levels are always very good.  Eat healthy and exercise daily  History of colon cancer last colonoscopy just last year now she moved 5-year cycle.  HM were updated      Healthy Habits:   Regular Exercise: Yes  Sunscreen Use: Yes  Healthy Diet: Yes  Dental Visits Regularly: Yes  Seat Belt: Yes  cologuard  No  Colonoscopy: Yes  Lipid Profile: Yes  Glucose Screen: Yes  Prevention of Osteoporosis: Yes  Last Dexa: Yes  Guns at Home:  N/A      Immunization History   Administered Date(s) Administered     Td,adult,historic,unspecified 07/08/2014       Immunization status: up to date and documented, Refuses Immunization.    Current Outpatient Prescriptions   Medication Sig Dispense Refill     cholecalciferol, vitamin D3, 2,000 unit Tab Take by mouth.       lactobacillus comb no.10 (PROBIOTIC) 20 billion cell cap Take by mouth.       multivitamin capsule Take 1 capsule by mouth daily.       simvastatin (ZOCOR) 20 MG tablet TAKE 1 TABLET AT BEDTIME 90 tablet 3     No current facility-administered medications for this visit.      Past Medical History:   Diagnosis Date     Colon cancer  "(H) 2013     Past Surgical History:   Procedure Laterality Date     BREAST BIOPSY Right 2007     OR APPENDECTOMY      Description: Appendectomy;  Recorded: 08/23/2013;     OR DILATION/CURETTAGE,DIAGNOSTIC      Description: Dilation And Curettage;  Recorded: 04/12/2012;     OR PART REMOVAL COLON W ANASTOMOSIS      Description: Partial Colectomy - Sigmoid;  Recorded: 08/22/2013;       Review of patient's allergies indicates no known allergies.    Family History   Problem Relation Age of Onset     Colon cancer Mother      Stroke Mother      Heart attack Father        Social History     Social History     Marital status:      Spouse name: N/A     Number of children: N/A     Years of education: N/A     Occupational History     Not on file.     Social History Main Topics     Smoking status: Former Smoker     Quit date: 12/8/2006     Smokeless tobacco: Never Used     Alcohol use Not on file     Drug use: Not on file     Sexual activity: Not on file     Other Topics Concern     Not on file     Social History Narrative       Social History     Social History Narrative       Current Diet:  well balanced diet    Functional Ability/Level of Safety  Fall Risk Factors:  No risk  Home Safety Risk Factors: None    Advanced Directive:  I have had an Advanced Directive discussion with the patient.    Co-Managers and Medical Equipment/Suppliers: None    Review of Systems  A 12 point comprehensive review of systems was negative except as noted.    Vitals:    11/05/18 0945   BP: 132/70   Patient Site: Left Arm   Patient Position: Sitting   Cuff Size: Adult Large   Pulse: 84   Weight: 177 lb 12.8 oz (80.6 kg)   Height: 5' 5.16\" (1.655 m)         General: Alert, no acute distress.   HEENT: normocephalic conjunctivae are clear, Normal pearly TMs bilaterally without erythema, pus or fluid.   Nose is clear.  Oropharynx is moist and clear,   Neck: supple without adenopathy or thyromegaly.  Lungs: Good aeration bilaterally. Clear to " auscultation without wheezes, rales or rhonci.  Heart: regular rate and rhythm, normal S1 and S2, no murmurs  Abdomen: soft and nontender, bowel sounds are present, no hepatosplenomegaly or mass palpable.  Skin: clear without rash or lesions  Neuro: alert, interactive moving all extremities equally, normal muscle tone in all 4 extremities, deep tendon reflexes 2+ symmetrically at the patella    The following high BMI interventions were performed this visit: dietary management education, guidance, and counseling    EKG : not done     Assessment Results 11/5/2018   Activities of Daily Living No help needed   Instrumental Activities of Daily Living No help needed   Mini Cog Total Score 3   Some recent data might be hidden     A Mini Cog score of 0-2 suggests the possibility of dementia, score of 3-5 suggests no dementia    Identified Health Risks:           Hien Pelaez MD 11/5/2018 10:10 AM

## 2021-06-27 ENCOUNTER — HEALTH MAINTENANCE LETTER (OUTPATIENT)
Age: 66
End: 2021-06-27

## 2021-07-13 ENCOUNTER — RECORDS - HEALTHEAST (OUTPATIENT)
Dept: ADMINISTRATIVE | Facility: CLINIC | Age: 66
End: 2021-07-13

## 2021-07-21 ENCOUNTER — RECORDS - HEALTHEAST (OUTPATIENT)
Dept: ADMINISTRATIVE | Facility: CLINIC | Age: 66
End: 2021-07-21

## 2021-08-22 ENCOUNTER — HEALTH MAINTENANCE LETTER (OUTPATIENT)
Age: 66
End: 2021-08-22

## 2022-07-24 ENCOUNTER — HEALTH MAINTENANCE LETTER (OUTPATIENT)
Age: 67
End: 2022-07-24

## 2022-10-02 ENCOUNTER — HEALTH MAINTENANCE LETTER (OUTPATIENT)
Age: 67
End: 2022-10-02

## 2023-10-21 ENCOUNTER — HEALTH MAINTENANCE LETTER (OUTPATIENT)
Age: 68
End: 2023-10-21